# Patient Record
Sex: FEMALE | Race: BLACK OR AFRICAN AMERICAN | Employment: FULL TIME | URBAN - METROPOLITAN AREA
[De-identification: names, ages, dates, MRNs, and addresses within clinical notes are randomized per-mention and may not be internally consistent; named-entity substitution may affect disease eponyms.]

---

## 2019-09-10 ENCOUNTER — TELEPHONE (OUTPATIENT)
Dept: SURGERY | Age: 44
End: 2019-09-10

## 2019-09-10 NOTE — TELEPHONE ENCOUNTER
Called the pt on cell phone, left a message for her to call our office to schedule an appointment with Dr. Renea Johnson. (Breast Discharge)      Called the home phone, no answer. [facility] Mailbox is full. Unable to leave a message. 2nd attempt in trying to reach the patient.

## 2019-09-11 ENCOUNTER — TELEPHONE (OUTPATIENT)
Dept: SURGERY | Age: 44
End: 2019-09-11

## 2019-09-11 NOTE — TELEPHONE ENCOUNTER
VEDA and schedule an appointment with Dr. Jigar Rosario    4th attempt in trying to reach the patient.

## 2019-09-18 ENCOUNTER — OFFICE VISIT (OUTPATIENT)
Dept: SURGERY | Age: 44
End: 2019-09-18

## 2019-09-18 VITALS
SYSTOLIC BLOOD PRESSURE: 152 MMHG | BODY MASS INDEX: 33.64 KG/M2 | TEMPERATURE: 98.2 F | HEIGHT: 70 IN | DIASTOLIC BLOOD PRESSURE: 94 MMHG | HEART RATE: 91 BPM | OXYGEN SATURATION: 95 % | RESPIRATION RATE: 18 BRPM | WEIGHT: 235 LBS

## 2019-09-18 DIAGNOSIS — N64.52 DISCHARGE FROM RIGHT NIPPLE: Primary | ICD-10-CM

## 2019-09-18 NOTE — PATIENT INSTRUCTIONS
If you have any questions or concerns about today's appointment, the verbal and/or written instructions you were given for follow up care, please call our office at [phone].     [facility] - [address], [address] [place] [address]    [phone] office  [phone]

## 2019-09-18 NOTE — PROGRESS NOTES
Nipple Discharge Consult    Ms. Edilberto Parks is a 53year old woman who was referred for right nipple discharge. She initially noticed the discharge around December 2018. She reports the discharge is clear, induced, and non-bloody. She reports occasional breast pain. She denies history of similar symptoms previously. She is not currently nursing. There is no family history of breast cancer. Breast/GYN history:    OB History    None          Past Medical History:   Diagnosis Date    Fibroids     Nausea & vomiting     Other ill-defined conditions(079.89)        Past Surgical History:   Procedure Laterality Date    HX GI      umbilical hernia surgery    HX GYN      c-sections x2    HX HERNIA REPAIR      HX HYSTERECTOMY      age 38       Current Outpatient Medications on File Prior to Visit   Medication Sig Dispense Refill    therapeutic multivitamin (THERAGRAN) tablet Take 1 Tab by mouth daily. Indications: VITAMIN DEFICIENCY PREVENTION       No current facility-administered medications on file prior to visit.         No Known Allergies    Social History     Tobacco Use    Smoking status: Never Smoker    Smokeless tobacco: Never Used   Substance Use Topics    Alcohol use: Yes     Comment: occ    Drug use: No       Family History   Problem Relation Age of Onset    Diabetes Maternal Grandmother     Dementia Maternal Grandmother          ROS:   General: denies fevers, chills, night sweats, fatigue, weight loss, weight gain  GI: denies nausea, vomiting, abdominal pain, change in bowel habits, hematochezia, melena, GERD  Integ: denies dermatitis, abnormal moles  HEENT: denies visual changes, vertigo, epistaxis, dysphagia, hoarseness  Cardiac: denies chest pain, palpitations, HTN, edema, varicosities  Resp: denies cough, shortness of breath, wheezing, hemoptysis, snoring, reactive airway disease  : denies hematuria, dysuria, frequency, urgency, nocturia, stress urinary incontinence MSK: weakness, joint pain, arthritis  Endocrine: denies diabetes, thyroid disease, polyuria, polydipsia, polyphagia, poor wound healing, heat intolerance, cold intolerance  Lymph/Heme: denies anemia, bruising, history of blood transfusions  Neuro: denies dizziness, headache, fainting, seizures, stroke  Psych: denies anxiety, depression    Physical Exam:  Visit Vitals  BP (!) 152/94 (BP 1 Location: Right arm, BP Patient Position: Sitting)   Pulse 91   Temp 98.2 °F (36.8 °C) (Oral)   Resp 18   Ht 5' 10\" (1.778 m)   Wt 106.6 kg (235 lb)   LMP 2013   SpO2 95%   BMI 33.72 kg/m²       Gen: No distress  Head: Normocephalic, atraumatic  Mouth: Clear, no overt lesions, oral mucosa pink and moist  Neck: Supple, no masses, no adenopathy, trachea midline  Resp: Clear bilaterally  Cardio: Regular rate and rhythm  Abdomen: soft, nontender, nondistended  Extremeties: warm, well-perfused  Neuro: sensation and strength grossly intact and symmetrical  Psych: alert and oriented to person, place and time  Breasts:  Right: Examined in both the supine and upright positions. There was no supraclavicular, infraclavicular, or axillary lympadenopathy. There were no dominant masses, no skin changes, no asymmetry identified induced clear discharge  Left:  Examined in both the supine and upright positions. There was no supraclavicular, infraclavicular, or axillary lympadenopathy. There were no dominant masses, no skin changes, no asymmetry identified     Imagin18 bilateral mammogram/right ultrasound [facility])  No mammographic or sonographic evidence of malignancy. Recommend annual screening mammography and clinical breast examination. A result letter will be sent to the patient. These findings and recommendations were discussed with the patient at the time of the evaluation.     The patient will be notified by the [employer] reminder system for scheduling of her annual screening mammogram.    Assessment Category 1: Negative    Signed By: Kathryn Ulrich MD on 12/17/2018 1:50 PM    Impression:  Patient Active Problem List   Diagnosis Code    Menorrhalgia N94.6    Fibroids, intramural D25.1    Discharge from right nipple N64.52        53year old woman with right nipple discharge. Given the nature of the discharge, we discussed it is most likely physiologic in nature. Based on description and exam, I think it is likely Ms. Chance Mott has fibrocystic changes of the breast. We discussed this process in detail. We discussed that it is not malignant. It may cause \"lumpiness\" of the breasts and may cause pain and/or nipple discharge No additional surgical intervention is needed. We will plan for conservative management. She is due for bilateral mammogram December 2019. I have recommended annual mammogram and annual clinical breast exam.  I have not scheduled any additional follow up. She was asked to call with questions or concerns.

## 2019-09-18 NOTE — LETTER
9/18/2019 10:17 AM 
 
Patient:  Gia Aleman YOB: 1975 Date of Visit: 9/18/2019 Ty Queen, [address] [facility] [address] [place] [id number] VIA Facsimile: [phone] Jeremy Garrett MD 
[address] [address] VIA Facsimile: [phone] Dear GONZALES Gutierrez MD, 
 
 
I had the pleasure of seeing Ms. Yoel Howard in my office today for right nipple discharge. I am including a copy of my office visit today. If you have questions, please do not hesitate to call me. I look forward to following Ms. Gaitan along with you and will keep you updated as to her progress. Sincerely, Chase Santiago MD